# Patient Record
Sex: FEMALE | Race: WHITE | NOT HISPANIC OR LATINO | URBAN - NONMETROPOLITAN AREA
[De-identification: names, ages, dates, MRNs, and addresses within clinical notes are randomized per-mention and may not be internally consistent; named-entity substitution may affect disease eponyms.]

---

## 2020-08-05 ENCOUNTER — IMPORTED ENCOUNTER (OUTPATIENT)
Dept: URBAN - NONMETROPOLITAN AREA CLINIC 1 | Facility: CLINIC | Age: 85
End: 2020-08-05

## 2020-08-05 PROCEDURE — 92014 COMPRE OPH EXAM EST PT 1/>: CPT

## 2020-08-05 PROCEDURE — 92015 DETERMINE REFRACTIVE STATE: CPT

## 2020-08-05 NOTE — PATIENT DISCUSSION
Pseudophakia OU- discussed findings w/ patient-  PCIOL in poisition-  PCO +3 OU noted today-  PCO Eval for JS in 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

## 2020-08-27 NOTE — PATIENT DISCUSSION
GLAUCOMA:  I have talked with the patient about my impressions, explained our treatment plan, and have answered all questions and patient understands. Patient to follow up in April / May. Next visit we will repeat VF OD, no OCT needed.

## 2020-11-27 PROBLEM — H52.12: Noted: 2020-11-27

## 2020-11-27 PROBLEM — H52.01: Noted: 2020-11-27

## 2020-11-27 PROBLEM — Z96.1: Noted: 2020-11-27

## 2020-11-27 PROBLEM — H26.493: Noted: 2020-11-27

## 2020-11-27 PROBLEM — H52.4: Noted: 2020-11-27

## 2020-11-27 PROBLEM — E11.9: Noted: 2020-11-27

## 2020-11-27 PROBLEM — H52.223: Noted: 2020-11-27

## 2021-01-26 ENCOUNTER — IMPORTED ENCOUNTER (OUTPATIENT)
Dept: URBAN - NONMETROPOLITAN AREA CLINIC 1 | Facility: CLINIC | Age: 86
End: 2021-01-26

## 2021-01-26 PROCEDURE — 92014 COMPRE OPH EXAM EST PT 1/>: CPT

## 2021-01-29 ENCOUNTER — IMPORTED ENCOUNTER (OUTPATIENT)
Dept: URBAN - NONMETROPOLITAN AREA CLINIC 1 | Facility: CLINIC | Age: 86
End: 2021-01-29

## 2021-01-29 PROCEDURE — 66821 AFTER CATARACT LASER SURGERY: CPT

## 2021-01-29 NOTE — PATIENT DISCUSSION
PCO-Explained PCO and RBAs of YAG Capsulotomy to pt. -Pt elects to proceed.  YAG Caps OD today then YAG Caps OS------------------------notes below are from previous----------------------Pseudophakia OU- discussed findings w/ patient-  PCIOL in poisition-  PCO +3 OU noted today-  PCO Eval for JS in [facility]  pt agrees-  monitor as per Yvette Blizzard or prnType 2 DM w/o Retinopathy OU-  discussed findings w/ patient-  medication controlled -  Last A1C unkown  BS not checked daily-  no retinopathy notated at this time- discussed the importance of good blood sugar control and negative side effects of poorly controlled blood sugars and its effects on occular health -  monitor yearly or prn 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

## 2021-02-15 ENCOUNTER — IMPORTED ENCOUNTER (OUTPATIENT)
Dept: URBAN - NONMETROPOLITAN AREA CLINIC 1 | Facility: CLINIC | Age: 86
End: 2021-02-15

## 2021-02-15 PROBLEM — H52.4: Noted: 2020-11-27

## 2021-02-15 PROBLEM — Z96.1: Noted: 2020-11-27

## 2021-02-15 PROBLEM — H26.492: Noted: 2021-02-15

## 2021-02-15 PROBLEM — H52.12: Noted: 2020-11-27

## 2021-02-15 PROBLEM — H52.223: Noted: 2020-11-27

## 2021-02-15 PROBLEM — H52.01: Noted: 2020-11-27

## 2021-02-15 PROBLEM — E11.9: Noted: 2020-11-27

## 2021-02-15 PROCEDURE — 66821 AFTER CATARACT LASER SURGERY: CPT

## 2021-02-15 NOTE — PATIENT DISCUSSION
PCO-Explained PCO and RBAs of YAG Capsulotomy to pt. -Pt elects to proceed.  YAG Caps ODS today ------------------------notes below are from previous----------------------Pseudophakia OU- discussed findings w/ patient-  PCIOL in poisition-  PCO +3 OU noted today-  PCO Eval for JS in [facility]  pt agrees-  monitor as per [address] [place] [address] or prnType 2 DM w/o Retinopathy OU-  discussed findings w/ patient-  medication controlled -  Last A1C unkown  BS not checked daily-  no retinopathy notated at this time- discussed the importance of good blood sugar control and negative side effects of poorly controlled blood sugars and its effects on occular health -  monitor yearly or prn 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

## 2021-06-24 NOTE — PATIENT DISCUSSION
GLAUCOMA:  I have talked with the patient about my impressions, explained our treatment plan, and have answered all questions and patient understands. Vero in a year.  Dialte and OCT

## 2021-08-07 NOTE — PATIENT DISCUSSION
PCO-Explained PCO and RBAs of YAG Capsulotomy to pt. -Pt elects to proceed.  YAG Caps OD first then YAG Caps OS w/ Serjio G[facility]mez in [place]------------------------notes below are from previous----------------------Pseudophakia OU- discussed findings w/ patient-  PCIOL in poisition-  PCO +3 OU noted today-  PCO Eval for JS in [facility]  pt agrees-  monitor as per [address] [place] [address] or prnType 2 DM w/o Retinopathy OU-  discussed findings w/ patient-  medication controlled -  Last A1C unkown  BS not checked daily-  no retinopathy notated at this time- discussed the importance of good blood sugar control and negative side effects of poorly controlled blood sugars and its effects on occular health -  monitor yearly or prn 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

## 2022-04-09 ASSESSMENT — TONOMETRY
OS_IOP_MMHG: 13
OS_IOP_MMHG: 14
OD_IOP_MMHG: 13
OD_IOP_MMHG: 15
OS_IOP_MMHG: 15
OD_IOP_MMHG: 16
OS_IOP_MMHG: 16
OD_IOP_MMHG: 13

## 2022-04-09 ASSESSMENT — VISUAL ACUITY
OD_SC: 20/25+2
OD_CC: 20/25
OS_CC: 20/30
OD_CC: 20/30
OS_GLARE: 20/40
OD_GLARE: 20/50
OS_CC: 20/30
OU_CC: 20/30
OU_CC: 20/25
OS_GLARE: 20/50
OS_SC: 20/25-2
OS_GLARE: 20/50
OS_CC: 20/40+
OD_CC: 20/40
OD_GLARE: 20/40
OU_SC: 20/30